# Patient Record
Sex: MALE | Race: WHITE
[De-identification: names, ages, dates, MRNs, and addresses within clinical notes are randomized per-mention and may not be internally consistent; named-entity substitution may affect disease eponyms.]

---
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
## 2022-01-14 ENCOUNTER — APPOINTMENT (OUTPATIENT)
Dept: CARDIOLOGY | Facility: CLINIC | Age: 22
End: 2022-01-14
Payer: COMMERCIAL

## 2022-01-14 ENCOUNTER — NON-APPOINTMENT (OUTPATIENT)
Age: 22
End: 2022-01-14

## 2022-01-14 VITALS — DIASTOLIC BLOOD PRESSURE: 70 MMHG | SYSTOLIC BLOOD PRESSURE: 130 MMHG

## 2022-01-14 VITALS
HEIGHT: 65.5 IN | HEART RATE: 93 BPM | DIASTOLIC BLOOD PRESSURE: 72 MMHG | SYSTOLIC BLOOD PRESSURE: 144 MMHG | WEIGHT: 156 LBS | OXYGEN SATURATION: 97 % | RESPIRATION RATE: 17 BRPM | BODY MASS INDEX: 25.68 KG/M2

## 2022-01-14 PROCEDURE — 99214 OFFICE O/P EST MOD 30 MIN: CPT

## 2022-01-14 PROCEDURE — 93000 ELECTROCARDIOGRAM COMPLETE: CPT

## 2022-01-14 NOTE — DISCUSSION/SUMMARY
[FreeTextEntry1] : Mr. Guardado continues to experience sporadic episodes of chest pain.  His exam shows regular rhythm, clear lungs, and a normal cardiac exam.  His EKG is within normal limits.\par \par Is now been 18 months since he was originally diagnosed.  We discussed the pros and cons of trying to discontinue his colchicine.  I think it is reasonable to try and he will stop the drug to see what happens.  If his symptoms worsen he will restart.  He'll follow-up in 6 months.

## 2022-01-14 NOTE — HISTORY OF PRESENT ILLNESS
[FreeTextEntry1] : 21-year-old male being followed for chronic pericarditis which started in 8/20.  He was last seen in 3/21.  He is feeling generally well and is active going to the gym on a regular basis.  Still has occasional "bad days" when he has more chest pain.  He is continuing on colchicine, but has not used any NSAIDs. For information on Fall & Injury Prevention, visit [email or web address]

## 2022-03-02 ENCOUNTER — APPOINTMENT (OUTPATIENT)
Dept: CARDIOLOGY | Facility: CLINIC | Age: 22
End: 2022-03-02
Payer: COMMERCIAL

## 2022-03-02 PROCEDURE — 99213 OFFICE O/P EST LOW 20 MIN: CPT | Mod: 95

## 2022-03-02 NOTE — HISTORY OF PRESENT ILLNESS
[FreeTextEntry1] : 22-year-old male being followed over the last 18 months for pericarditis which is gradually been improving.  He stopped taking colchicine around mid January.  He has had a few minor episodes of chest pain since but has not had to restart the drug.\par \par A few weeks ago he smoked marijuana for the first time.  He had some paranoid reactions associated with rapid heart beating and chest discomfort.  He was seen in a local emergency room, but reports he was not in atrial fibrillation and they did not see anything abnormal.  He wonders if he requires any further work-up.  I told him he did not.\par \par He will follow-up in 6 months or as needed.

## 2022-10-07 ENCOUNTER — APPOINTMENT (OUTPATIENT)
Dept: CARDIOLOGY | Facility: CLINIC | Age: 22
End: 2022-10-07

## 2022-10-07 ENCOUNTER — NON-APPOINTMENT (OUTPATIENT)
Age: 22
End: 2022-10-07

## 2022-10-07 VITALS
WEIGHT: 156 LBS | HEIGHT: 65.5 IN | HEART RATE: 80 BPM | OXYGEN SATURATION: 97 % | SYSTOLIC BLOOD PRESSURE: 120 MMHG | DIASTOLIC BLOOD PRESSURE: 70 MMHG | BODY MASS INDEX: 25.68 KG/M2

## 2022-10-07 DIAGNOSIS — R07.9 CHEST PAIN, UNSPECIFIED: ICD-10-CM

## 2022-10-07 DIAGNOSIS — I31.3 PERICARDIAL EFFUSION (NONINFLAMMATORY): ICD-10-CM

## 2022-10-07 PROCEDURE — 99214 OFFICE O/P EST MOD 30 MIN: CPT

## 2022-10-07 PROCEDURE — 93000 ELECTROCARDIOGRAM COMPLETE: CPT

## 2022-10-07 NOTE — HISTORY OF PRESENT ILLNESS
[FreeTextEntry1] : 22-year-old male with a history of chronic pericarditis which started in 8/20.  He was last seen in March at which time his symptoms were minimal and his colchicine was stopped.  He since then has noted some different sensations.  He gets tingling in the left side of his chest which radiates down his left arm.  He is more active and goes to the gym regularly, but does not do any upper body exercise.  He has not taken any colchicine or NSAIDs.

## 2022-10-07 NOTE — DISCUSSION/SUMMARY
[FreeTextEntry1] : Mr. Guardado has been experiencing some chest pain during the past few months which is different than his symptoms in the past and which is somewhat atypical for pericardial pain.  His exam shows regular rhythm, clear lungs, no rub and normal cardiac exam.  His EKG is within normal limits.\par \par I am unsure what this new pain is due to.  He will have an echo done to make sure there is no pericardial fluid.  Assuming this is normal, he will remain off colchicine and we will continue to observe.  He will follow-up in 6 months. [EKG obtained to assist in diagnosis and management of assessed problem(s)] : EKG obtained to assist in diagnosis and management of assessed problem(s)

## 2022-12-07 ENCOUNTER — APPOINTMENT (OUTPATIENT)
Dept: CARDIOLOGY | Facility: CLINIC | Age: 22
End: 2022-12-07

## 2022-12-07 DIAGNOSIS — Q23.1 CONGENITAL INSUFFICIENCY OF AORTIC VALVE: ICD-10-CM

## 2022-12-07 PROCEDURE — 93306 TTE W/DOPPLER COMPLETE: CPT

## 2022-12-08 PROBLEM — Q23.1 BICUSPID AORTIC VALVE: Status: ACTIVE | Noted: 2022-12-08

## 2023-04-07 ENCOUNTER — APPOINTMENT (OUTPATIENT)
Dept: CARDIOLOGY | Facility: CLINIC | Age: 23
End: 2023-04-07